# Patient Record
Sex: FEMALE | Race: WHITE
[De-identification: names, ages, dates, MRNs, and addresses within clinical notes are randomized per-mention and may not be internally consistent; named-entity substitution may affect disease eponyms.]

---

## 2018-04-12 ENCOUNTER — HOSPITAL ENCOUNTER (OUTPATIENT)
Dept: HOSPITAL 80 - FED | Age: 68
Setting detail: OBSERVATION
LOS: 1 days | Discharge: HOME | End: 2018-04-13
Attending: SURGERY | Admitting: SURGERY
Payer: COMMERCIAL

## 2018-04-12 DIAGNOSIS — K56.609: Primary | ICD-10-CM

## 2018-04-12 DIAGNOSIS — Z82.49: ICD-10-CM

## 2018-04-12 DIAGNOSIS — E05.00: ICD-10-CM

## 2018-04-12 DIAGNOSIS — E78.5: ICD-10-CM

## 2018-04-12 DIAGNOSIS — I10: ICD-10-CM

## 2018-04-12 PROCEDURE — 71046 X-RAY EXAM CHEST 2 VIEWS: CPT

## 2018-04-12 PROCEDURE — 93005 ELECTROCARDIOGRAM TRACING: CPT

## 2018-04-12 PROCEDURE — 74019 RADEX ABDOMEN 2 VIEWS: CPT

## 2018-04-12 PROCEDURE — G0378 HOSPITAL OBSERVATION PER HR: HCPCS

## 2018-04-12 PROCEDURE — 74250 X-RAY XM SM INT 1CNTRST STD: CPT

## 2018-04-12 PROCEDURE — 74177 CT ABD & PELVIS W/CONTRAST: CPT

## 2018-04-12 NOTE — CPEKG
Heart Rate: 60

RR Interval: 1000

P-R Interval: 184

QRSD Interval: 90

QT Interval: 396

QTC Interval: 396

P Axis: 15

QRS Axis: -5

T Wave Axis: 16

EKG Severity - OTHERWISE NORMAL ECG -

EKG Impression: SINUS RHYTHM

EKG Impression: LOW VOLTAGE IN FRONTAL LEADS

Electronically Signed By: Lidia Javier 12-Apr-2018 22:04:45

## 2018-04-12 NOTE — SOAPPROG
SOAP Progress Note


Assessment/Plan: 


Assessment:


67 female with one day hx of abd cramps and bloating/ ct shows sbo/ only prior 

abd surgery was tubal many years ago/ no flatus or bm today, no emesis


heent ok


'chest clear


cor rr


abd slightly distended with bs, nontender, no hernias























Plan:observation, ng if emesis





04/12/18 21:29





Objective: 





 Vital Signs











Temp Pulse Resp BP Pulse Ox


 


 36.4 C   65   16   138/81 H  95 


 


 04/12/18 21:17  04/12/18 21:17  04/12/18 21:17  04/12/18 21:17  04/12/18 21:17








 











 04/11/18 04/12/18 04/13/18





 05:59 05:59 05:59


 


Intake Total   1000


 


Balance   1000














ICD10 Worksheet


Patient Problems: 


 Problems











Problem Status Onset


 


Small bowel obstruction Acute

## 2018-04-12 NOTE — EDPHY
HPI/HX/ROS/PE/MDM


Narrative: 





CHIEF COMPLAINT:  Small bowel obstruction on CT





HISTORY OF PRESENT ILLNESS: The patient is a 66 y/o female with a history of 

Graves disease and hyperlipidemia arriving from the imaging department for 

admission for a small bowel obstruction seen on CT today. She noticed diarrhea 

yesterday and this morning upon waking noticed diffuse "tight" abdominal pain 

and nausea. Pain was so severe she remained bent over and was unable to find a 

comfortable position. She took Tylenol for pain with improvement. She never 

vomited, but has been belching frequently. She is not passing gas. She was 

evaluated at urgent care today and ultimately referred for an outpatient CT here

, which showed a mechanical small bowel obstruction. While waiting for the CT 

results, she ate some mashed potatoes in the cafeteria around 17:30 about 2 

hours ago and felt better and has not vomited. Upon receiving the CT results 

she was sent to the ED for admission to the hospital. Her only abdominal 

surgery was a tubal ligation. She has no personal history of diabetes or 

hypertension; her father  from [place]. She has not had a stress test or recent 

EKG. 





No fever, chills, chest pain, shortness of breath, palpitations, vomiting, 

urinary complaints, headache, lightheadedness. 





REVIEW OF SYSTEMS:


Aside from elements discussed in the HPI, a comprehensive 10-point review of 

systems was reviewed and is negative.





PAST MEDICAL HISTORY:   


1. Graves disease treated with radioactive iodine - on Synthroid


2. Hyperlipidemia - Lipitor


3. Edema


4. Pneumonia x2


5. Tubal ligation


6. Foot surgery





FAMILY HISTORY: Father  of "massive heart attack" and had CABGx3.





SOCIAL HISTORY:    at bedside. Lives in [place]. PCP: Dr. Reyes





VITAL SIGNS: Reviewed by me. /78


GENERAL: Well-developed, well-nourished, resting comfortably in no respiratory 

distress.


HEENT: Atraumatic. Eyes: No icterus, no injection. Mouth: moist mucous 

membranes.  No erythema or lesions. Neck: supple with no adenopathy.


LUNGS: Clear to auscultation bilaterally, no wheezes, rhonchi or rales.


CARDIAC: Regular rate and rhythm, no rubs, murmurs or gallops.


ABDOMEN: Soft, epigastric and RUQ and LUQ tenderness, mild, no guarding or 

rebound, nondistended.


BACK:  Left CVA tenderness.


EXTREMITIES: No trauma. No edema.  Range of motion is normal throughout.


NEURO: Alert and oriented,  grossly nonfocal.  


SKIN: Warm and dry, no rash.


PSYCHIATRIC: Normal mentation, no agitation.





Portions of this note were transcribed by a medical scribe.  I personally 

performed a history, physical exam, medical decision making, and confirmed 

accuracy of information the transcribed note.


ED Course: 


CT: small bowel obstruction





Consulted with Dr. Krueger, surgeon, who appreciates the call. Requests on-call 

surgeon see the patient. 





Plan for IV, UA, EKG, chest x-ray, and symptom management. Will not repeat labs 

as she just had these performed a few hours ago at urgent care. She was mildly 

hyponatremic at 128. 1L IV NS, 4mg IV Zofran ordered. 





The 12 lead EKG was interpreted by myself. Sinus mechanism rate 60. See hard 

copy and/or "tracemaster" electronic copy for interpretation.





Consulted with Dr. Cordova, surgery. He will assess patient in the ED. 





Spoke with hospitalist service. Dr. Richard accepts admission. 


MDM: 





Diff dx considered included small bowel obstruction, ileus, partial bowel 

obstruction, gastroenteritis, volvulus. 





- Data Points


Imaging Results: 


 Imaging Impressions





Abdomen CT  18 18:00


Impression:  Fluid-filled distention of the stomach and small bowel compatible 

with mechanical small bowel obstruction.


 


Results called to Dr. Agarwal at 6:10 p.m.


 


 











Imaging: Discussed imaging studies w/ On call Radiologist, I viewed and 

interpreted images myself


Laboratory Results: 


 











  18





  18:40


 


Urine Color  PALE YELLOW 





  


 


Urine Appearance  CLEAR 





  


 


Urine pH  7.0 





   (5.0-7.5) 


 


Ur Specific Gravity  1.021 





   (1.002-1.030) 


 


Urine Protein  NEGATIVE 





   (NEGATIVE) 


 


Urine Ketones  NEGATIVE 





   (NEGATIVE) 


 


Urine Blood  NEGATIVE 





   (NEGATIVE) 


 


Urine Nitrate  NEGATIVE 





   (NEGATIVE) 


 


Urine Bilirubin  NEGATIVE 





   (NEGATIVE) 


 


Urine Urobilinogen  NEGATIVE EU EU





   (0.2-1.0) 


 


Ur Leukocyte Esterase  TRACE  H 





   (NEGATIVE) 


 


Urine RBC  1-3 /hpf /hpf





   (0-3) 


 


Urine WBC  1-3 /hpf /hpf





   (0-3) 


 


Ur Epithelial Cells  TRACE /lpf /lpf





   (NONE-1+) 


 


Urine Glucose  NEGATIVE 





   (NEGATIVE) 











Medications Given: 


 








Discontinued Medications





Sodium Chloride (Ns)  1,000 mls @ 0 mls/hr IV ONCE ONE; Wide Open


   PRN Reason: Protocol


   Stop: 18 19:34


   Last Admin: 18 19:52 Dose:  1,000 mls


Ondansetron HCl (Zofran)  4 mg IVP EDNOW ONE


   Stop: 18 19:34


   Last Admin: 18 19:53 Dose:  4 mg








General


Initial Vital Signs: 


 Initial Vital Signs











Temperature (C)  36.8 C   18 18:37


 


Heart Rate  75   18 18:37


 


Respiratory Rate  18   18 18:37


 


Blood Pressure  140/78 H  18 18:37


 


O2 Sat (%)  96   18 18:37








 











O2 Delivery Mode               Room Air














Allergies/Adverse Reactions: 


 





azithromycin Allergy (Verified 18 18:35)


 


hydrocodone [From Norco] Allergy (Verified 18 18:35)


 








Home Medications: 














 Medication  Instructions  Recorded


 


Atorvastatin Calcium [Lipitor 10 10 mg PO HS 18





mg (*)]  


 


Herbals/Supplements -Info Only 1 ea PO DAILY 18


 


Levothyroxine [Synthroid 112 mcg 112 mcg PO DAILY06 18





(*)]  


 


Triamterene/Hydrochlorothiazid 1 each PO HS 18





[Triamterene-Hctz 37.5-25 mg Tb]  


 


oxyCODONE IR [Oxycodone Ir (*)] 5 - 10 mg PO Q4HRS PRN #30 tab 18














Departure





- Departure


Disposition: [facility]


Clinical Impression: 


 Small bowel obstruction





Abdominal pain


Qualifiers:


 Abdominal location: generalized Qualified Code(s): R10.84 - Generalized 

abdominal pain





Condition: Good


Report Scribed for: Lidia Javier


Report Scribed by: Leidy Starks


Date of Report: 18


Time of Report: 19:34

## 2018-04-12 NOTE — PDGENHP
History and Physical





- Chief Complaint


Abdominal pain





- History of Present Illness


68 yo F w/ HTN and Grave's s/p APODACA presents w/ abdominal pain. Patient noted 

diarrhea yesterday. Then, today she began to experience abdominal pain and 

distention. She denies vomiting and BRBPR or melena. She also denies fevers and 

chills. The pain progressed throughout the day so she presented to the ED. In 

the ED CT scan was c/w mechanical SBO. Her only prior abdominal procedure was 

tubal ligation several decades ago. She has no prior hx of bowel obstructions. 

At the time of evaluation pain is improved and she is now passing gas.





History Information





- Allergies/Home Medication List


Allergies/Adverse Reactions: 








azithromycin Allergy (Verified 04/12/18 18:35)


 


hydrocodone [From Norco] Allergy (Verified 04/12/18 18:35)


 





Home Medications: 








Atorvastatin Calcium [Lipitor 10 mg (*)] 10 mg PO HS 04/12/18 [Last Taken 04/11/ 18]


Herbals/Supplements -Info Only 1 ea PO DAILY 04/12/18 [Last Taken 04/12/18]


Levothyroxine [Synthroid 112 mcg (*)] 112 mcg PO DAILY06 04/12/18 [Last Taken 04 /12/18]


Triamterene/Hydrochlorothiazid [Triamterene-Hctz 37.5-25 mg Tb] 1 each PO HS 04/ 12/18 [Last Taken 04/11/18]





I have personally reviewed and updated: family history, medical history





- Past Medical History


hypertension


Additional medical history: Grave's disease s/p APODACA





- Surgical History


Additional surgical history: Tubal ligation





- Family History


Positive for: CAD





- Social History


Smoking Status: Never smoked





Review of Systems


Review of Systems: 





ROS: 10pt was reviewed & negative except for what was stated in HPI & below





Physical Exam


Physical Exam: 

















Temp Pulse Resp BP Pulse Ox


 


 36.8 C   78   16   119/62   96 


 


 04/12/18 23:37  04/12/18 23:37  04/12/18 23:37  04/12/18 23:37  04/12/18 23:37











Constitutional: no apparent distress, not in pain


Eyes: PERRL, EOMI


Ears, Nose, Mouth, Throat: moist mucous membranes, no oral mucosal ulcers


Cardiovascular: regular rate and rhythym, no murmur, rub, or gallop


Respiratory: no respiratory distress, no rales or rhonchi


Gastrointestinal: normoactive bowel sounds, tenderness (Mild, diffuse), 

distension, No guarding, No rebound


Skin: warm, normal color


Musculoskeletal: full muscle strength, no muscle tenderness


Neurologic: AAOx3, CN II-XII Intact


Psychiatric: interacting appropriately, not anxious





Lab Data & Imaging Review














Urine Color  PALE YELLOW   04/12/18  18:40    


 


Urine Appearance  CLEAR   04/12/18  18:40    


 


Urine pH  7.0  (5.0-7.5)   04/12/18  18:40    


 


Ur Specific Gravity  1.021  (1.002-1.030)   04/12/18  18:40    


 


Urine Protein  NEGATIVE  (NEGATIVE)   04/12/18  18:40    


 


Urine Ketones  NEGATIVE  (NEGATIVE)   04/12/18  18:40    


 


Urine Blood  NEGATIVE  (NEGATIVE)   04/12/18  18:40    


 


Urine Nitrate  NEGATIVE  (NEGATIVE)   04/12/18  18:40    


 


Urine Bilirubin  NEGATIVE  (NEGATIVE)   04/12/18  18:40    


 


Urine Urobilinogen  NEGATIVE EU (0.2-1.0)   04/12/18  18:40    


 


Ur Leukocyte Esterase  TRACE  (NEGATIVE)  H  04/12/18  18:40    


 


Urine RBC  1-3 /hpf (0-3)   04/12/18  18:40    


 


Urine WBC  1-3 /hpf (0-3)   04/12/18  18:40    


 


Ur Epithelial Cells  TRACE /lpf (NONE-1+)   04/12/18  18:40    


 


Urine Glucose  NEGATIVE  (NEGATIVE)   04/12/18  18:40    








Imaging Review: 





 Imaging Impressions





Abdomen CT  04/12/18 18:00


Impression:  Fluid-filled distention of the stomach and small bowel compatible 

with mechanical small bowel obstruction.


 


Results called to Dr. Agarwal at 6:10 p.m.


 


 








Chest X-Ray  04/12/18 19:33


Impression:  Query airways disease, with other findings noted as described 

above.














Assessment & Plan


Assessment: 








68 yo F w/ HTN and Grave's s/p APODACA presents w/ SBO.








Plan: 


1. SBO - CT findings c/w mechanical SBO. Overall this appears mild as patient 

has not vomited and is now again passing gas. Only abdominal surgery is tubal 

ligation several decades ago.


- Admit for observation


- mIVF, pain control, anti-emetics PRN


- NGT if pain worsens or develops vomiting


- Surgery consulted, appreciate assistance


2. HTN - Continue home medications


3. Grave's disease - S/p APODACA, now on LTX therapy, continue.





Diet - NPO, mIVF


Code - Full


Ppx - SCDs


Dispo - Admit under observation status

## 2018-04-13 VITALS — SYSTOLIC BLOOD PRESSURE: 123 MMHG | DIASTOLIC BLOOD PRESSURE: 74 MMHG

## 2018-04-13 LAB — PLATELET # BLD: 177 10^3/UL (ref 150–400)

## 2018-04-13 NOTE — ASMTCMCOM
CM Note

 

CM Note                       

Notes:

Spoke w/RN, anticipate pt will dc home w/support of  when medically stable. CM available for 


any changes.



DC Plan: Independent

 

Date Signed:  04/13/2018 03:29 PM

Electronically Signed By:Bhavana Garces RN

## 2018-04-13 NOTE — SOAPPROG
SOAP Progress Note


Assessment/Plan: 


Assessment:








66 y/o female with SBO





S: Doing much better today.  Passed lots of gas last night and feels better.  

Pain controlled.





O: Alert


Afebrile


RRR


No increased WOB


Abdomen: soft, nontender, +BS








Plan:  Small bowel follow through to evaluate SBO.  Pleased pt has passed gas 

and is improving. Clear liquid diet.  Likely home later today or tomorrow.





04/13/18 08:45





Objective: 





 Vital Signs











Temp Pulse Resp BP Pulse Ox


 


 36.3 C   62   16   119/73   93 


 


 04/13/18 08:25  04/13/18 08:25  04/13/18 08:25  04/13/18 08:25  04/13/18 08:25








 Laboratory Results





 04/13/18 04:40 





 04/13/18 04:40 





 











 04/12/18 04/13/18 04/14/18





 05:59 05:59 05:59


 


Intake Total  1866 


 


Output Total  400 400


 


Balance  1466 -400














ICD10 Worksheet


Patient Problems: 


 Problems











Problem Status Onset


 


Small bowel obstruction Acute

## 2018-04-13 NOTE — HOSPPROG
Hospitalist Progress Note


Assessment/Plan: 





68 yo F w/ HTN and Grave's s/p APODACA presents w/ SBO.








Plan: 


1. SBO - CT findings c/w mechanical SBO.  Only abdominal surgery is tubal 

ligation several decades ago.


- Admit for observation


- mIVF, pain control, anti-emetics PRN


- Surgery consulted, appreciate assistance


- SBFT ordered, pending


-tolerating clears, if sbft normal will advance diet as tolerated


2. HTN - Continue home medications


3. Grave's disease - S/p APODACA, now on LTX therapy, continue.





Diet - NPO, mIVF


Code - Full


Ppx - SCDs


Dispo - Admit under observation status


Subjective: no significant overnight events, feeling better, tolerating clears


Objective: 


 Vital Signs











Temp Pulse Resp BP Pulse Ox


 


 36.7 C   52 L  17   123/74 H  99 


 


 04/13/18 15:38  04/13/18 15:38  04/13/18 15:38  04/13/18 15:38  04/13/18 15:38








 Laboratory Results





 04/13/18 04:40 





 04/13/18 04:40 





 











 04/12/18 04/13/18 04/14/18





 05:59 05:59 05:59


 


Intake Total  1866 360


 


Output Total  400 1000


 


Balance  1466 -640








awake alert nad


anicteric


op clear


rrr no mrg


cta b


soft bs present nt nd


no cce


warm dry well perfused


oriented appropriate





ICD10 Worksheet


Patient Problems: 


 Problems











Problem Status Onset


 


Small bowel obstruction Acute

## 2018-06-12 ENCOUNTER — HOSPITAL ENCOUNTER (OUTPATIENT)
Dept: HOSPITAL 80 - BMCIMAGING | Age: 68
End: 2018-06-12
Attending: INTERNAL MEDICINE
Payer: COMMERCIAL

## 2018-06-12 DIAGNOSIS — E07.9: ICD-10-CM

## 2018-06-12 DIAGNOSIS — M85.89: ICD-10-CM

## 2018-06-12 DIAGNOSIS — Z13.820: Primary | ICD-10-CM

## 2018-06-12 DIAGNOSIS — Z78.0: ICD-10-CM

## 2018-06-27 ENCOUNTER — HOSPITAL ENCOUNTER (OUTPATIENT)
Dept: HOSPITAL 80 - BMCIMAGING | Age: 68
End: 2018-06-27
Attending: PODIATRIST
Payer: COMMERCIAL

## 2018-06-27 DIAGNOSIS — M79.671: Primary | ICD-10-CM

## 2018-06-27 DIAGNOSIS — M25.871: ICD-10-CM

## 2018-09-27 ENCOUNTER — HOSPITAL ENCOUNTER (OUTPATIENT)
Dept: HOSPITAL 80 - BMCIMAGING | Age: 68
End: 2018-09-27
Attending: INTERNAL MEDICINE
Payer: COMMERCIAL

## 2018-09-27 DIAGNOSIS — Z12.31: Primary | ICD-10-CM

## 2019-03-04 ENCOUNTER — HOSPITAL ENCOUNTER (OUTPATIENT)
Dept: HOSPITAL 80 - BMCIMAGING | Age: 69
End: 2019-03-04
Attending: INTERNAL MEDICINE
Payer: COMMERCIAL

## 2019-03-04 DIAGNOSIS — R07.89: Primary | ICD-10-CM

## 2019-03-07 ENCOUNTER — HOSPITAL ENCOUNTER (OUTPATIENT)
Dept: HOSPITAL 80 - BMCIMAGING | Age: 69
End: 2019-03-07
Attending: FAMILY MEDICINE
Payer: COMMERCIAL

## 2019-03-07 DIAGNOSIS — S92.535A: Primary | ICD-10-CM
